# Patient Record
Sex: FEMALE | Race: WHITE | NOT HISPANIC OR LATINO | Employment: STUDENT | URBAN - NONMETROPOLITAN AREA
[De-identification: names, ages, dates, MRNs, and addresses within clinical notes are randomized per-mention and may not be internally consistent; named-entity substitution may affect disease eponyms.]

---

## 2019-05-30 ENCOUNTER — OFFICE VISIT (OUTPATIENT)
Dept: URGENT CARE | Facility: PHYSICIAN GROUP | Age: 34
End: 2019-05-30

## 2019-05-30 VITALS
TEMPERATURE: 96.9 F | HEART RATE: 54 BPM | WEIGHT: 191 LBS | SYSTOLIC BLOOD PRESSURE: 128 MMHG | DIASTOLIC BLOOD PRESSURE: 70 MMHG | OXYGEN SATURATION: 99 % | RESPIRATION RATE: 16 BRPM

## 2019-05-30 DIAGNOSIS — S01.01XA LACERATION OF SCALP, INITIAL ENCOUNTER: ICD-10-CM

## 2019-05-30 PROCEDURE — 99203 OFFICE O/P NEW LOW 30 MIN: CPT | Performed by: PHYSICIAN ASSISTANT

## 2019-05-30 RX ORDER — SULFAMETHOXAZOLE AND TRIMETHOPRIM 800; 160 MG/1; MG/1
1 TABLET ORAL 2 TIMES DAILY
Qty: 14 TAB | Refills: 0 | Status: SHIPPED | OUTPATIENT
Start: 2019-05-30 | End: 2019-06-06

## 2019-05-31 NOTE — PROGRESS NOTES
Chief Complaint   Patient presents with   • Head Injury     drill fell on head, no loss of consciousness, no dizziness, no nausea        HISTORY OF PRESENT ILLNESS: Patient is a 34 y.o. female who presents today for the following:    Patient comes in with her  for evaluation of a scalp laceration that she sustained a few hours ago.  Patient states the drill fell on her head.  It was a flat part, not the end with the bit.  Patient states it did bleed quite a bit but was able to wash it all out in the shower.  Her  then applied some superglue to the area.  She denies any pain but just wants to make sure it is okay.  Her last tetanus shot was a couple of years ago.    There are no active problems to display for this patient.      Allergies:Patient has no known allergies.    Current Outpatient Prescriptions Ordered in [facility]   Medication Sig Dispense Refill   • sulfamethoxazole-trimethoprim (BACTRIM DS) 800-160 MG tablet Take 1 Tab by mouth 2 times a day for 7 days. 14 Tab 0     No current Epic-ordered facility-administered medications on file.        No past medical history on file.    Social History   Substance Use Topics   • Smoking status: Never Smoker   • Smokeless tobacco: Never Used   • Alcohol use Yes      Comment: social, occ       No family status information on file.   No family history on file.    Review of Systems:   Constitutional ROS: No unexpected change in weight, No weakness, No fatigue  Musculoskeletal/Extremities ROS: No peripheral edema, No pain, redness or swelling on the joints  Hematologic/Lymphatic ROS: No chills, No night sweats, No weight loss  Skin/Integumentary ROS: Scalp laceration.      Exam:  /70   Pulse (!) 54   Temp 36.1 °C (96.9 °F)   Resp 16   Wt 86.6 kg (191 lb)   SpO2 99%   General: Well developed, well nourished. No distress.  Pulmonary: Unlabored respiratory effort.  Neurologic: Grossly nonfocal. No facial asymmetry noted.  Skin: Warm, dry, good turgor.  5 x  2 mm wound is noted on the left side of the scalp, just beyond the hairline above the forehead.  No active bleeding is noted.  Unable to approximate edges due to dried superglue in place.  Psych: Normal mood. Alert and oriented x3. Judgment and insight is normal.    Assessment/Plan:  Patient's tetanus shot is up-to-date.  No signs of infection noted at this time.  Contingent antibiotics provided should any signs of infection develop.  Discussed wound care at home.  Follow-up for any signs of infection as discussed in clinic.  1. Laceration of scalp, initial encounter  sulfamethoxazole-trimethoprim (BACTRIM DS) 800-160 MG tablet

## 2021-05-07 ENCOUNTER — OFFICE VISIT (OUTPATIENT)
Dept: MEDICAL GROUP | Facility: PHYSICIAN GROUP | Age: 36
End: 2021-05-07
Payer: COMMERCIAL

## 2021-05-07 VITALS
WEIGHT: 207 LBS | TEMPERATURE: 97.9 F | RESPIRATION RATE: 16 BRPM | HEIGHT: 64 IN | BODY MASS INDEX: 35.34 KG/M2 | OXYGEN SATURATION: 100 % | SYSTOLIC BLOOD PRESSURE: 118 MMHG | HEART RATE: 90 BPM | DIASTOLIC BLOOD PRESSURE: 80 MMHG

## 2021-05-07 DIAGNOSIS — E66.9 OBESITY (BMI 30-39.9): ICD-10-CM

## 2021-05-07 DIAGNOSIS — L98.9 SKIN LESION OF HAND: ICD-10-CM

## 2021-05-07 DIAGNOSIS — Z13.220 SCREENING, LIPID: ICD-10-CM

## 2021-05-07 DIAGNOSIS — Z13.0 SCREENING FOR ENDOCRINE, METABOLIC AND IMMUNITY DISORDER: ICD-10-CM

## 2021-05-07 DIAGNOSIS — Z13.228 SCREENING FOR ENDOCRINE, METABOLIC AND IMMUNITY DISORDER: ICD-10-CM

## 2021-05-07 DIAGNOSIS — Z00.00 ANNUAL PHYSICAL EXAM: ICD-10-CM

## 2021-05-07 DIAGNOSIS — Z30.431 ENCOUNTER FOR MANAGEMENT OF INTRAUTERINE CONTRACEPTIVE DEVICE (IUD), UNSPECIFIED IUD MANAGEMENT TYPE: ICD-10-CM

## 2021-05-07 DIAGNOSIS — Z13.29 SCREENING FOR ENDOCRINE, METABOLIC AND IMMUNITY DISORDER: ICD-10-CM

## 2021-05-07 PROCEDURE — 99214 OFFICE O/P EST MOD 30 MIN: CPT | Mod: 25 | Performed by: FAMILY MEDICINE

## 2021-05-07 PROCEDURE — 17110 DESTRUCTION B9 LES UP TO 14: CPT | Performed by: FAMILY MEDICINE

## 2021-05-07 ASSESSMENT — PATIENT HEALTH QUESTIONNAIRE - PHQ9: CLINICAL INTERPRETATION OF PHQ2 SCORE: 0

## 2021-05-07 NOTE — ASSESSMENT & PLAN NOTE
Round macular evenly pigmented slightly rough patch of skin on left hand dorsum.     Skin cleaned with alcohol pads  Treated with cryotherapy x 2 times with pulse application till ice ball persists.   Antibiotic ointment and bandaid placed  Patient tolerated the procedure well  Wound care instructions provided.

## 2021-05-07 NOTE — PROGRESS NOTES
"Subjective:   Ching Robertson is a 36 y.o. adult here today for evaluation and management of:     Encounter for management of intrauterine contraceptive device (IUD)  Patient has Paraguard IUD in place for 11 years no abdominal pain, no complications.   Has worked well for her.   She needs it removed and possible replacement.   She would like to have a referral to ob/gyn, referral placed today.   She asked about hysterectomy as her mother and mother's grandmother had heavy bleeding and cysts in her 50s and patient does not want to go though the same thing.   I advised that a hysterectomy may not be a an option, but it's something to discuss with the specialist.       Skin lesion of hand  Round macular evenly pigmented slightly rough patch of skin on left hand dorsum.     Skin cleaned with alcohol pads  Treated with cryotherapy x 2 times with pulse application till ice ball persists.   Antibiotic ointment and bandaid placed  Patient tolerated the procedure well  Wound care instructions provided.          Current medicines (including changes today)  No current outpatient medications on file.     No current facility-administered medications for this visit.     She  has no past medical history on file.    ROS  No chest pain, no shortness of breath, no abdominal pain       Objective:     /80   Pulse 90   Temp 36.6 °C (97.9 °F) (Temporal)   Resp 16   Ht 1.626 m (5' 4\")   Wt 93.9 kg (207 lb)   SpO2 100%  Body mass index is 35.53 kg/m².   Physical Exam:  Constitutional: Alert, no distress.  Skin: Warm, dry, good turgor, no rashes in visible areas.  Eye: Equal, round and reactive, conjunctiva clear, lids normal.  ENMT: Lips without lesions, good dentition, oropharynx clear.  Neck: Trachea midline, no masses, no thyromegaly. No cervical or supraclavicular lymphadenopathy  Respiratory: Unlabored respiratory effort, lungs clear to auscultation, no wheezes, no ronchi.  Cardiovascular: Normal S1, S2, no murmur, no " edema.  Abdomen: Soft, non-tender, no masses, no hepatosplenomegaly.  Psych: Alert and oriented x3, normal affect and mood.        Assessment and Plan:   The following treatment plan was discussed    1. Encounter for management of intrauterine contraceptive device (IUD), unspecified IUD management type    - REFERRAL TO OB/GYN    2. Obesity (BMI 30-39.9)    - Patient identified as having weight management issue.  Appropriate orders and counseling given.  - Comp Metabolic Panel; Future  - Lipid Profile; Future    3. Screening, lipid    - Lipid Profile; Future    4. Screening for endocrine, metabolic and immunity disorder    - Comp Metabolic Panel; Future    5. Annual physical exam  - Comp Metabolic Panel; Future  - Lipid Profile; Future      Followup: Return in about 6 months (around 11/7/2021) for PAP.

## 2021-05-07 NOTE — ASSESSMENT & PLAN NOTE
Patient has Paraguard IUD in place for 11 years no abdominal pain, no complications.   Has worked well for her.   She needs it removed and possible replacement.   She would like to have a referral to ob/gyn, referral placed today.   She asked about hysterectomy as her mother and mother's grandmother had heavy bleeding and cysts in her 50s and patient does not want to go though the same thing.   I advised that a hysterectomy may not be a an option, but it's something to discuss with the specialist.

## 2023-03-24 ENCOUNTER — OFFICE VISIT (OUTPATIENT)
Dept: MEDICAL GROUP | Facility: PHYSICIAN GROUP | Age: 38
End: 2023-03-24
Payer: COMMERCIAL

## 2023-03-24 ENCOUNTER — HOSPITAL ENCOUNTER (OUTPATIENT)
Facility: MEDICAL CENTER | Age: 38
End: 2023-03-24
Attending: FAMILY MEDICINE
Payer: COMMERCIAL

## 2023-03-24 VITALS
HEIGHT: 64 IN | DIASTOLIC BLOOD PRESSURE: 70 MMHG | WEIGHT: 199 LBS | OXYGEN SATURATION: 99 % | RESPIRATION RATE: 16 BRPM | HEART RATE: 63 BPM | TEMPERATURE: 97.6 F | BODY MASS INDEX: 33.97 KG/M2 | SYSTOLIC BLOOD PRESSURE: 126 MMHG

## 2023-03-24 DIAGNOSIS — E66.9 OBESITY (BMI 30-39.9): ICD-10-CM

## 2023-03-24 DIAGNOSIS — Z23 NEED FOR VACCINATION: ICD-10-CM

## 2023-03-24 DIAGNOSIS — Z30.432 ENCOUNTER FOR IUD REMOVAL: ICD-10-CM

## 2023-03-24 DIAGNOSIS — Z12.4 CERVICAL CANCER SCREENING: ICD-10-CM

## 2023-03-24 DIAGNOSIS — Z11.59 NEED FOR HEPATITIS C SCREENING TEST: ICD-10-CM

## 2023-03-24 DIAGNOSIS — E55.9 VITAMIN D DEFICIENCY: ICD-10-CM

## 2023-03-24 DIAGNOSIS — Z13.220 SCREENING, LIPID: ICD-10-CM

## 2023-03-24 PROCEDURE — 99000 SPECIMEN HANDLING OFFICE-LAB: CPT | Performed by: FAMILY MEDICINE

## 2023-03-24 PROCEDURE — 90686 IIV4 VACC NO PRSV 0.5 ML IM: CPT | Performed by: FAMILY MEDICINE

## 2023-03-24 PROCEDURE — 99396 PREV VISIT EST AGE 40-64: CPT | Mod: 25 | Performed by: FAMILY MEDICINE

## 2023-03-24 PROCEDURE — 87624 HPV HI-RISK TYP POOLED RSLT: CPT

## 2023-03-24 PROCEDURE — 90471 IMMUNIZATION ADMIN: CPT | Performed by: FAMILY MEDICINE

## 2023-03-24 PROCEDURE — 88175 CYTOPATH C/V AUTO FLUID REDO: CPT

## 2023-03-24 PROCEDURE — 58301 REMOVE INTRAUTERINE DEVICE: CPT | Performed by: FAMILY MEDICINE

## 2023-03-24 RX ORDER — CLOSTRIDIUM TETANI TOXOID ANTIGEN (FORMALDEHYDE INACTIVATED), CORYNEBACTERIUM DIPHTHERIAE TOXOID ANTIGEN (FORMALDEHYDE INACTIVATED), BORDETELLA PERTUSSIS TOXOID ANTIGEN (GLUTARALDEHYDE INACTIVATED), BORDETELLA PERTUSSIS FILAMENTOUS HEMAGGLUTININ ANTIGEN (FORMALDEHYDE INACTIVATED), BORDETELLA PERTUSSIS PERTACTIN ANTIGEN, AND BORDETELLA PERTUSSIS FIMBRIAE 2/3 ANTIGEN 5; 2; 2.5; 5; 3; 5 [LF]/.5ML; [LF]/.5ML; UG/.5ML; UG/.5ML; UG/.5ML; UG/.5ML
0.5 INJECTION, SUSPENSION INTRAMUSCULAR ONCE
Qty: 0.5 ML | Refills: 0 | Status: SHIPPED
Start: 2023-03-24 | End: 2023-03-24

## 2023-03-24 ASSESSMENT — PATIENT HEALTH QUESTIONNAIRE - PHQ9: CLINICAL INTERPRETATION OF PHQ2 SCORE: 0

## 2023-03-24 NOTE — PATIENT INSTRUCTIONS
Please get fasting labs, fasting for 8-10 hours before next visit. You can make an appointment for the lab or walk in.   Lab hours [facility] location: Monday to Friday 6 am - 4 pm, Saturday 7 am -noon  Even if you lose your lab paperwork, you can still come in to get your lab done.

## 2023-03-25 DIAGNOSIS — Z12.4 CERVICAL CANCER SCREENING: ICD-10-CM

## 2023-03-25 NOTE — ASSESSMENT & PLAN NOTE
Copper T iuD removed completely no complications.   IUD strings visualized with speculum.   Strings grasped with ringed forceps, with gentle traction, IUD removed, examined and entire IUD removal confirmed.   Patient tolerated the procedure well.

## 2023-03-28 PROBLEM — Z12.4 CERVICAL CANCER SCREENING: Status: ACTIVE | Noted: 2023-03-28

## 2023-03-28 LAB
CYTOLOGY REG CYTOL: NORMAL
HPV HR 12 DNA CVX QL NAA+PROBE: NEGATIVE
HPV16 DNA SPEC QL NAA+PROBE: NEGATIVE
HPV18 DNA SPEC QL NAA+PROBE: NEGATIVE
SPECIMEN SOURCE: NORMAL

## 2023-03-28 NOTE — PROGRESS NOTES
"Subjective:   Ching Robertson is a 38 y.o. female here today for evaluation and management of:     Encounter for IUD removal  Copper T iuD removed completely no complications.   IUD strings visualized with speculum.   Strings grasped with ringed forceps, with gentle traction, IUD removed, examined and entire IUD removal confirmed.   Patient tolerated the procedure well.       Cervical cancer screening  PAP completed.              Current medicines (including changes today)  No current outpatient medications on file.     No current facility-administered medications for this visit.     She  has no past medical history on file.    ROS  No chest pain, no shortness of breath, no abdominal pain       Objective:     /70 (BP Location: Left arm, Patient Position: Sitting, BP Cuff Size: Adult)   Pulse 63   Temp 36.4 °C (97.6 °F) (Temporal)   Resp 16   Ht 1.626 m (5' 4\")   Wt 90.3 kg (199 lb)   SpO2 99%  Body mass index is 34.16 kg/m².   Physical Exam:  Constitutional: Alert, no distress.  Skin: Warm, dry, good turgor, no rashes in visible areas.  Eye: Equal, round and reactive, conjunctiva clear, lids normal.  ENMT: Lips without lesions, good dentition, oropharynx clear.  Neck: Trachea midline, no masses, no thyromegaly. No cervical or supraclavicular lymphadenopathy  Respiratory: Unlabored respiratory effort, lungs clear to auscultation, no wheezes, no ronchi.  Cardiovascular: Normal S1, S2, no murmur, no edema.  Abdomen: Soft, non-tender, no masses, no hepatosplenomegaly.  Psych: Alert and oriented x3, normal affect and mood.  Pelvic: IUD strings seen, small amount of blood in vault from menses. No external masses, no vaginal masses, no cervical masses, no CMT.       Assessment and Plan:   The following treatment plan was discussed    1. Need for vaccination  - INFLUENZA VACCINE QUAD INJ (PF)  - tetanus-dipth-acell pertussis (ADACEL) 5-2-15.5 LF-MCG/0.5 Suspension; Inject 0.5 mL into the shoulder, thigh, or " buttocks one time for 1 dose.  Dispense: 0.5 mL; Refill: 0  - Hepatitis B Vaccine Adult 20+    2. Need for hepatitis C screening test  - HCV Scrn ( 9726-7801 1xLife); Future    3. Vitamin D deficiency  - VITAMIN D,25 HYDROXY (DEFICIENCY); Future    4. Screening, lipid  - Lipid Profile; Future    5. Obesity (BMI 30-39.9)  - Comp Metabolic Panel; Future    6. Cervical cancer screening  - THINPREP PAP WITH HPV; Future    7. Encounter for IUD removal      Followup: Return in about 3 months (around 2023) for Lab Review.

## 2023-04-11 NOTE — RESULT ENCOUNTER NOTE
Hi April Ailyn,   Your pap and hpv results are normal. Next is due in 3-5 years.   Chloe Brown M.D.

## 2023-06-08 ENCOUNTER — NON-PROVIDER VISIT (OUTPATIENT)
Dept: URGENT CARE | Facility: PHYSICIAN GROUP | Age: 38
End: 2023-06-08

## 2023-06-08 DIAGNOSIS — Z02.1 PRE-EMPLOYMENT DRUG SCREENING: ICD-10-CM

## 2023-06-08 LAB
AMP AMPHETAMINE: NORMAL
COC COCAINE: NORMAL
INT CON NEG: NORMAL
INT CON POS: NORMAL
MET METHAMPHETAMINES: NORMAL
OPI OPIATES: NORMAL
PCP PHENCYCLIDINE: NORMAL
POC DRUG COMMENT 753798-OCCUPATIONAL HEALTH: NORMAL
THC: NORMAL

## 2023-06-08 PROCEDURE — 80305 DRUG TEST PRSMV DIR OPT OBS: CPT | Performed by: FAMILY MEDICINE

## 2023-06-14 ENCOUNTER — APPOINTMENT (OUTPATIENT)
Dept: RADIOLOGY | Facility: MEDICAL CENTER | Age: 38
End: 2023-06-14
Attending: STUDENT IN AN ORGANIZED HEALTH CARE EDUCATION/TRAINING PROGRAM
Payer: COMMERCIAL

## 2023-06-14 ENCOUNTER — HOSPITAL ENCOUNTER (EMERGENCY)
Facility: MEDICAL CENTER | Age: 38
End: 2023-06-15
Attending: STUDENT IN AN ORGANIZED HEALTH CARE EDUCATION/TRAINING PROGRAM
Payer: COMMERCIAL

## 2023-06-14 DIAGNOSIS — R51.9 ACUTE NONINTRACTABLE HEADACHE, UNSPECIFIED HEADACHE TYPE: ICD-10-CM

## 2023-06-14 DIAGNOSIS — S16.1XXA STRAIN OF NECK MUSCLE, INITIAL ENCOUNTER: ICD-10-CM

## 2023-06-14 DIAGNOSIS — M54.2 NECK PAIN: ICD-10-CM

## 2023-06-14 PROCEDURE — 700102 HCHG RX REV CODE 250 W/ 637 OVERRIDE(OP): Performed by: STUDENT IN AN ORGANIZED HEALTH CARE EDUCATION/TRAINING PROGRAM

## 2023-06-14 PROCEDURE — 72125 CT NECK SPINE W/O DYE: CPT

## 2023-06-14 PROCEDURE — 700117 HCHG RX CONTRAST REV CODE 255: Performed by: STUDENT IN AN ORGANIZED HEALTH CARE EDUCATION/TRAINING PROGRAM

## 2023-06-14 PROCEDURE — 72040 X-RAY EXAM NECK SPINE 2-3 VW: CPT

## 2023-06-14 PROCEDURE — 70450 CT HEAD/BRAIN W/O DYE: CPT

## 2023-06-14 PROCEDURE — 99284 EMERGENCY DEPT VISIT MOD MDM: CPT

## 2023-06-14 PROCEDURE — 700101 HCHG RX REV CODE 250: Performed by: STUDENT IN AN ORGANIZED HEALTH CARE EDUCATION/TRAINING PROGRAM

## 2023-06-14 PROCEDURE — 96372 THER/PROPH/DIAG INJ SC/IM: CPT

## 2023-06-14 PROCEDURE — 96375 TX/PRO/DX INJ NEW DRUG ADDON: CPT

## 2023-06-14 PROCEDURE — A9270 NON-COVERED ITEM OR SERVICE: HCPCS | Performed by: STUDENT IN AN ORGANIZED HEALTH CARE EDUCATION/TRAINING PROGRAM

## 2023-06-14 PROCEDURE — 700105 HCHG RX REV CODE 258: Performed by: STUDENT IN AN ORGANIZED HEALTH CARE EDUCATION/TRAINING PROGRAM

## 2023-06-14 PROCEDURE — 700111 HCHG RX REV CODE 636 W/ 250 OVERRIDE (IP): Performed by: STUDENT IN AN ORGANIZED HEALTH CARE EDUCATION/TRAINING PROGRAM

## 2023-06-14 PROCEDURE — 70498 CT ANGIOGRAPHY NECK: CPT

## 2023-06-14 PROCEDURE — 96374 THER/PROPH/DIAG INJ IV PUSH: CPT

## 2023-06-14 RX ORDER — LIDOCAINE 50 MG/G
1 PATCH TOPICAL EVERY 24 HOURS
Status: DISCONTINUED | OUTPATIENT
Start: 2023-06-14 | End: 2023-06-15 | Stop reason: HOSPADM

## 2023-06-14 RX ORDER — SODIUM CHLORIDE 9 MG/ML
1000 INJECTION, SOLUTION INTRAVENOUS ONCE
Status: COMPLETED | OUTPATIENT
Start: 2023-06-14 | End: 2023-06-15

## 2023-06-14 RX ORDER — KETOROLAC TROMETHAMINE 30 MG/ML
15 INJECTION, SOLUTION INTRAMUSCULAR; INTRAVENOUS ONCE
Status: COMPLETED | OUTPATIENT
Start: 2023-06-14 | End: 2023-06-14

## 2023-06-14 RX ORDER — PROCHLORPERAZINE EDISYLATE 5 MG/ML
10 INJECTION INTRAMUSCULAR; INTRAVENOUS ONCE
Status: COMPLETED | OUTPATIENT
Start: 2023-06-14 | End: 2023-06-14

## 2023-06-14 RX ORDER — METHOCARBAMOL 500 MG/1
500 TABLET, FILM COATED ORAL ONCE
Status: COMPLETED | OUTPATIENT
Start: 2023-06-14 | End: 2023-06-14

## 2023-06-14 RX ORDER — HYDROMORPHONE HYDROCHLORIDE 1 MG/ML
0.5 INJECTION, SOLUTION INTRAMUSCULAR; INTRAVENOUS; SUBCUTANEOUS ONCE
Status: COMPLETED | OUTPATIENT
Start: 2023-06-14 | End: 2023-06-14

## 2023-06-14 RX ORDER — ACETAMINOPHEN 325 MG/1
650 TABLET ORAL ONCE
Status: COMPLETED | OUTPATIENT
Start: 2023-06-14 | End: 2023-06-14

## 2023-06-14 RX ADMIN — METHOCARBAMOL 500 MG: 500 TABLET ORAL at 21:54

## 2023-06-14 RX ADMIN — SODIUM CHLORIDE 1000 ML: 9 INJECTION, SOLUTION INTRAVENOUS at 22:36

## 2023-06-14 RX ADMIN — ACETAMINOPHEN 650 MG: 325 TABLET, FILM COATED ORAL at 21:54

## 2023-06-14 RX ADMIN — IOHEXOL 60 ML: 350 INJECTION, SOLUTION INTRAVENOUS at 23:40

## 2023-06-14 RX ADMIN — KETOROLAC TROMETHAMINE 15 MG: 30 INJECTION, SOLUTION INTRAMUSCULAR; INTRAVENOUS at 21:54

## 2023-06-14 RX ADMIN — HYDROMORPHONE HYDROCHLORIDE 0.5 MG: 1 INJECTION, SOLUTION INTRAMUSCULAR; INTRAVENOUS; SUBCUTANEOUS at 22:37

## 2023-06-14 RX ADMIN — PROCHLORPERAZINE EDISYLATE 10 MG: 5 INJECTION INTRAMUSCULAR; INTRAVENOUS at 22:38

## 2023-06-14 RX ADMIN — LIDOCAINE 1 PATCH: 50 PATCH CUTANEOUS at 21:45

## 2023-06-14 ASSESSMENT — PAIN DESCRIPTION - PAIN TYPE
TYPE: ACUTE PAIN
TYPE: ACUTE PAIN

## 2023-06-15 VITALS
HEART RATE: 53 BPM | WEIGHT: 202.16 LBS | TEMPERATURE: 97.5 F | BODY MASS INDEX: 34.51 KG/M2 | DIASTOLIC BLOOD PRESSURE: 57 MMHG | RESPIRATION RATE: 16 BRPM | SYSTOLIC BLOOD PRESSURE: 112 MMHG | HEIGHT: 64 IN | OXYGEN SATURATION: 98 %

## 2023-06-15 RX ORDER — LIDOCAINE 50 MG/G
1 PATCH TOPICAL EVERY 24 HOURS
Qty: 10 PATCH | Refills: 0 | Status: SHIPPED | OUTPATIENT
Start: 2023-06-15

## 2023-06-15 RX ORDER — METHOCARBAMOL 750 MG/1
750 TABLET, FILM COATED ORAL 4 TIMES DAILY
Qty: 120 TABLET | Refills: 0 | Status: SHIPPED | OUTPATIENT
Start: 2023-06-15

## 2023-06-15 NOTE — ED NOTES
"Discharge instructions reviewed with patient/family.  Patient verbalizes understanding of follow up care, medication management and reasons to return to the ER. PIV Removed with no complications. /57   Pulse (!) 53   Temp 36.4 °C (97.5 °F)   Resp 16   Ht 1.626 m (5' 4\")   Wt 91.7 kg (202 lb 2.6 oz)   LMP 06/07/2023   SpO2 98%   BMI 34.70 kg/m²   "

## 2023-06-15 NOTE — ED NOTES
Patient medicated as reflected in MAR. Patient verbalized understanding of all education provided. Patient tolerated medication at this time.

## 2023-06-15 NOTE — ED PROVIDER NOTES
"ED Provider Note    CHIEF COMPLAINT  Chief Complaint   Patient presents with    Neck Pain     L sided neck pain for appx 2-3 days with L sided headache. 8/10 dull pinching pain to neck. [place] a \"cracking and pop\" to neck today while sitting. +nausea        EXTERNAL RECORDS REVIEWED  Routine outpatient care    HPI/ROS  LIMITATION TO HISTORY   Select: : None  OUTSIDE HISTORIAN(S):  Significant other  at bedside    Ching Robertson is a 38 y.o. female who presents with neck pain.  Symptoms started about 3 days ago.  She said it started after her  cracked her back.  She says it is mostly to the left side of her neck.  She also has an associated headache which she describes as frontal.  He describes it as an 8 out of 10, dull and pinching sensation.  She said that today she had cracking and popping in her neck while she was sitting.  She denies any fevers, chills, recent illness.  No sore throat.  She did see her chiropractor today who ordered an x-ray but does not have the results yet.  She denies any numbness or tingling.  No weakness in her arms or legs.  No urinary retention, urinary incontinence, bowel incontinence or saddle anesthesia.    PAST MEDICAL HISTORY   has a past medical history of Patient denies medical problems.    SURGICAL HISTORY  patient denies any surgical history    FAMILY HISTORY  No family history on file.    SOCIAL HISTORY  Social History     Tobacco Use    Smoking status: Never    Smokeless tobacco: Never   Vaping Use    Vaping Use: Never used   Substance and Sexual Activity    Alcohol use: Yes     Comment: occ    Drug use: Never    Sexual activity: Yes       CURRENT MEDICATIONS  Home Medications       Reviewed by Mulu Sanchez R.N. (Registered Nurse) on 06/14/23 at 2030  Med List Status: Partial     Medication Last Dose Status        Patient Victorino Taking any Medications                           ALLERGIES  No Known Allergies    PHYSICAL EXAM  VITAL SIGNS: /57   Pulse (!) " "53   Temp 36.4 °C (97.5 °F)   Resp 16   Ht 1.626 m (5' 4\")   Wt 91.7 kg (202 lb 2.6 oz)   LMP 06/07/2023   SpO2 98%   BMI 34.70 kg/m²    Constitutional: Awake and alert. Nontoxic  HENT: No signs of trauma.  No erythema or edema to the posterior pharynx.  Eyes: Grossly normal  Neck: She has no midline tenderness. She has full range of motion without pain.  Cardiovascular: Normal heart rate   Thorax & Lungs: No respiratory distress  Abdomen: Nontender  Skin:  No pathologic rash.   Neuro:  CN II-CN XII intact  Sensation intact to light touch in all extremities.  Motor: Normal tone and bulk. No abnormal movements appreciated. Strength tested and 5/5 in bilateral wrist flexion/extension, elbow flexion/extension, shoulder abduction, straight leg raise, knee flexion/extension, ankle dorsiflexion/plantarflexion. Patient ambulates with a steady gait.  Extremities: Well perfused  Psychiatric: Affect normal      DIAGNOSTIC STUDIES / PROCEDURES        RADIOLOGY  I have independently interpreted the diagnostic imaging associated with this visit and am waiting the final reading from the radiologist.   My preliminary interpretation is as follows: No obvious ICH  Radiologist interpretation:   CT-CTA NECK WITH & W/O-POST PROCESSING   Final Result         1.  CT angiogram of the neck within normal limits.         CT-CSPINE WITHOUT PLUS RECONS   Final Result         1.  No acute traumatic bony injury of the cervical spine is apparent.      CT-HEAD W/O   Final Result         1.  No acute intracranial abnormality.         DX-CERVICAL SPINE-2 OR 3 VIEWS   Final Result         1.  No acute traumatic bony injury of the cervical spine is appreciated.            COURSE & MEDICAL DECISION MAKING    ED Observation Status? Yes; I am placing the patient in to an observation status due to a diagnostic uncertainty as well as therapeutic intensity. Patient placed in observation status at 21.07 PM, 6/14/2023.     Observation plan is as " follows: IV, CT, Serial Exams    Upon Reevaluation, the patient's condition has: Improved; and will be discharged.    Patient discharged from ED Observation status at 12:14 AM 6/15/2023      INITIAL ASSESSMENT, COURSE AND PLAN  Care Narrative: Patient is afebrile, vital signs reassuring.  She is mentating normally and neurologically intact.  Exam remarkable for tenderness to the cervical paraspinal muscle. No midline spinal tenderness. No overlying rashes. No swelling to the posterior pharynx, tonsils appear normal bilaterally.  Patient is able to range neck without limitation.  Low suspicion for infectious source, such as RPA or PTA . They are non toxic, mentating normally without meningismus and doubt meningitis or CNS infection.  Regarding her headache she does not have a cranial nerve palsy to suggest a venous sinus thrombosis.  No visual changes to suggest intracranial hypertension.  Low suspicion for glaucoma or temporal arteritis given young age.  She was treated with Robaxin, Toradol, Tylenol.     10:24 PM  She was reevaluated she does not report any improvement.  X-ray is negative for acute fracture.  Given no improvement, will obtain further imaging to evaluate for intracranial bleed and/or dissection.     12:13 AM  CTA negative for dissection or occlusion.  CT without evidence of fracture or subluxation.  No evidence of intracranial bleed, history not consistent with SAH.  She was treated with Dilaudid, Compazine and a fluid bolus at this point.  Patient reevaluated she does report improvement.  She continues to be neurologically intact with normal vital signs.  I feel reassured by her normal work-up in the ER and think she can be safely discharged.  Most likely she has a muscle strain, headache more likely benign etiology.  Advised close outpatient follow-up and strict ER return precautions and she expresses understanding.  She was discharged home in good condition.    Of note ,patient stated no chance  of pregnancy prior to CT scan and was okay to proceed with CT without waiting for confirmation with pregnancy test.    HYDRATION: Based on the patient's presentation of Inability to take oral fluids the patient was given IV fluids. IV Hydration was used because oral hydration was not adequate alone. Upon recheck following hydration, the patient was improved.        DISPOSITION AND DISCUSSIONS  I have discussed management of the patient with the following physicians and ERINN's:  None    Discussion of management with other QHP or appropriate source(s): None     Escalation of care considered, and ultimately not performed:blood analysis    Barriers to care at this time, including but not limited to:  None .     Decision tools and prescription drugs considered including, but not limited to: Pain medications ibuprofen, Tylenol, Robaxin and lidocaine patches.    FINAL DIAGNOSIS  1. Neck pain Acute   2. Strain of neck muscle, initial encounter Acute   3. Acute nonintractable headache, unspecified headache type           Electronically signed by: Katrin Timmons M.D., 6/14/2023 9:19 PM

## 2023-06-15 NOTE — ED TRIAGE NOTES
"Chief Complaint   Patient presents with    Neck Pain     L sided neck pain for appx 2-3 days with L sided headache. 8/10 dull pinching pain to neck. [place] a \"cracking and pop\" to neck today while sitting. +nausea        Pt ambulated with steady gait to triage. Pt A&Ox4, on room air. Pt states neck pain started after  \"cracked\" her back. Pt saw chiropracter today and had x-ray to neck done, still does not have results. Pt denies any numbness or tingling, still has control of bowel and bladder. +dizziness since pain.     Pt to lobby . Pt educated on alerting staff in changes to condition. Pt verbalized understanding.     /69   Pulse 66   Temp 35.8 °C (96.5 °F) (Temporal)   Resp 16   Ht 1.626 m (5' 4\")   Wt 91.7 kg (202 lb 2.6 oz)   LMP 06/07/2023   SpO2 100%   BMI 34.70 kg/m²     "

## 2023-06-15 NOTE — DISCHARGE INSTRUCTIONS
For pain please alternate between ibuprofen and Tylenol every 4 hours.  You can also take the muscle relaxant as prescribed but please do not drink, drive or operate machinery when using this medication.  Please follow-up closely with your primary care doctor if you have persistent symptoms they can order further imaging if needed.  If you have any increase or worsening pain, numbness, weakness, or bladder or bowel dysfunction  please return to the ER immediately.

## 2024-10-18 ENCOUNTER — APPOINTMENT (OUTPATIENT)
Dept: RADIOLOGY | Facility: MEDICAL CENTER | Age: 39
End: 2024-10-18
Attending: FAMILY MEDICINE
Payer: COMMERCIAL

## 2024-10-18 ENCOUNTER — OFFICE VISIT (OUTPATIENT)
Dept: URGENT CARE | Facility: PHYSICIAN GROUP | Age: 39
End: 2024-10-18
Payer: COMMERCIAL

## 2024-10-18 ENCOUNTER — HOSPITAL ENCOUNTER (OUTPATIENT)
Dept: LAB | Facility: MEDICAL CENTER | Age: 39
End: 2024-10-18
Attending: FAMILY MEDICINE
Payer: COMMERCIAL

## 2024-10-18 VITALS
OXYGEN SATURATION: 98 % | RESPIRATION RATE: 20 BRPM | BODY MASS INDEX: 35.2 KG/M2 | TEMPERATURE: 97.2 F | WEIGHT: 206.2 LBS | DIASTOLIC BLOOD PRESSURE: 62 MMHG | HEART RATE: 78 BPM | SYSTOLIC BLOOD PRESSURE: 120 MMHG | HEIGHT: 64 IN

## 2024-10-18 DIAGNOSIS — R19.7 DIARRHEA, UNSPECIFIED TYPE: ICD-10-CM

## 2024-10-18 LAB
BASOPHILS # BLD AUTO: 0.3 % (ref 0–1.8)
BASOPHILS # BLD: 0.03 K/UL (ref 0–0.12)
C DIFF DNA SPEC QL NAA+PROBE: NEGATIVE
C DIFF TOX GENS STL QL NAA+PROBE: NEGATIVE
C PARVUM AG STL QL IA.RAPID: NEGATIVE
EOSINOPHIL # BLD AUTO: 0 K/UL (ref 0–0.51)
EOSINOPHIL NFR BLD: 0 % (ref 0–6.9)
ERYTHROCYTE [DISTWIDTH] IN BLOOD BY AUTOMATED COUNT: 45.7 FL (ref 35.9–50)
G LAMBLIA AG STL QL IA.RAPID: NEGATIVE
HCT VFR BLD AUTO: 46.3 % (ref 37–47)
HGB BLD-MCNC: 15.4 G/DL (ref 12–16)
IMM GRANULOCYTES # BLD AUTO: 0.02 K/UL (ref 0–0.11)
IMM GRANULOCYTES NFR BLD AUTO: 0.2 % (ref 0–0.9)
LYMPHOCYTES # BLD AUTO: 0.59 K/UL (ref 1–4.8)
LYMPHOCYTES NFR BLD: 5.7 % (ref 22–41)
MCH RBC QN AUTO: 30.9 PG (ref 27–33)
MCHC RBC AUTO-ENTMCNC: 33.3 G/DL (ref 32.2–35.5)
MCV RBC AUTO: 92.8 FL (ref 81.4–97.8)
MONOCYTES # BLD AUTO: 0.28 K/UL (ref 0–0.85)
MONOCYTES NFR BLD AUTO: 2.7 % (ref 0–13.4)
NEUTROPHILS # BLD AUTO: 9.49 K/UL (ref 1.82–7.42)
NEUTROPHILS NFR BLD: 91.1 % (ref 44–72)
NRBC # BLD AUTO: 0 K/UL
NRBC BLD-RTO: 0 /100 WBC (ref 0–0.2)
PLATELET # BLD AUTO: 313 K/UL (ref 164–446)
PMV BLD AUTO: 10.7 FL (ref 9–12.9)
RBC # BLD AUTO: 4.99 M/UL (ref 4.2–5.4)
WBC # BLD AUTO: 10.4 K/UL (ref 4.8–10.8)

## 2024-10-18 PROCEDURE — 36415 COLL VENOUS BLD VENIPUNCTURE: CPT

## 2024-10-18 PROCEDURE — 87493 C DIFF AMPLIFIED PROBE: CPT

## 2024-10-18 PROCEDURE — 83690 ASSAY OF LIPASE: CPT

## 2024-10-18 PROCEDURE — 3078F DIAST BP <80 MM HG: CPT | Performed by: FAMILY MEDICINE

## 2024-10-18 PROCEDURE — 85025 COMPLETE CBC W/AUTO DIFF WBC: CPT

## 2024-10-18 PROCEDURE — 87328 CRYPTOSPORIDIUM AG IA: CPT

## 2024-10-18 PROCEDURE — 3074F SYST BP LT 130 MM HG: CPT | Performed by: FAMILY MEDICINE

## 2024-10-18 PROCEDURE — 80053 COMPREHEN METABOLIC PANEL: CPT

## 2024-10-18 PROCEDURE — 99213 OFFICE O/P EST LOW 20 MIN: CPT | Performed by: FAMILY MEDICINE

## 2024-10-18 PROCEDURE — 87045 FECES CULTURE AEROBIC BACT: CPT

## 2024-10-18 PROCEDURE — 87899 AGENT NOS ASSAY W/OPTIC: CPT

## 2024-10-18 PROCEDURE — 87329 GIARDIA AG IA: CPT

## 2024-10-18 PROCEDURE — 87046 STOOL CULTR AEROBIC BACT EA: CPT

## 2024-10-18 RX ORDER — ONDANSETRON 4 MG/1
4 TABLET, ORALLY DISINTEGRATING ORAL EVERY 8 HOURS PRN
Qty: 10 TABLET | Refills: 0 | Status: SHIPPED | OUTPATIENT
Start: 2024-10-18

## 2024-10-18 RX ORDER — DICYCLOMINE HCL 20 MG
20 TABLET ORAL EVERY 6 HOURS
Qty: 30 TABLET | Refills: 0 | Status: SHIPPED | OUTPATIENT
Start: 2024-10-18

## 2024-10-19 LAB
ALBUMIN SERPL BCP-MCNC: 4.2 G/DL (ref 3.2–4.9)
ALBUMIN/GLOB SERPL: 1.5 G/DL
ALP SERPL-CCNC: 80 U/L (ref 30–99)
ALT SERPL-CCNC: 20 U/L (ref 2–50)
ANION GAP SERPL CALC-SCNC: 14 MMOL/L (ref 7–16)
AST SERPL-CCNC: 16 U/L (ref 12–45)
BILIRUB SERPL-MCNC: 0.8 MG/DL (ref 0.1–1.5)
BUN SERPL-MCNC: 10 MG/DL (ref 8–22)
CALCIUM ALBUM COR SERPL-MCNC: 8.2 MG/DL (ref 8.5–10.5)
CALCIUM SERPL-MCNC: 8.4 MG/DL (ref 8.5–10.5)
CHLORIDE SERPL-SCNC: 104 MMOL/L (ref 96–112)
CO2 SERPL-SCNC: 22 MMOL/L (ref 20–33)
CREAT SERPL-MCNC: 0.7 MG/DL (ref 0.5–1.4)
E COLI SXT1+2 STL IA: NORMAL
GFR SERPLBLD CREATININE-BSD FMLA CKD-EPI: 111 ML/MIN/1.73 M 2
GLOBULIN SER CALC-MCNC: 2.8 G/DL (ref 1.9–3.5)
GLUCOSE SERPL-MCNC: 98 MG/DL (ref 65–99)
LIPASE SERPL-CCNC: 22 U/L (ref 11–82)
POTASSIUM SERPL-SCNC: 4.4 MMOL/L (ref 3.6–5.5)
PROT SERPL-MCNC: 7 G/DL (ref 6–8.2)
SIGNIFICANT IND 70042: NORMAL
SITE SITE: NORMAL
SODIUM SERPL-SCNC: 140 MMOL/L (ref 135–145)
SOURCE SOURCE: NORMAL

## 2024-10-21 LAB
BACTERIA STL CULT: NORMAL
E COLI SXT1+2 STL IA: NORMAL
SIGNIFICANT IND 70042: NORMAL
SITE SITE: NORMAL
SOURCE SOURCE: NORMAL

## 2025-02-13 ENCOUNTER — APPOINTMENT (OUTPATIENT)
Dept: RADIOLOGY | Facility: MEDICAL CENTER | Age: 40
End: 2025-02-13
Attending: FAMILY MEDICINE
Payer: COMMERCIAL

## 2025-02-13 DIAGNOSIS — Z12.31 VISIT FOR SCREENING MAMMOGRAM: ICD-10-CM

## 2025-03-14 ENCOUNTER — APPOINTMENT (OUTPATIENT)
Dept: RADIOLOGY | Facility: MEDICAL CENTER | Age: 40
End: 2025-03-14
Attending: FAMILY MEDICINE
Payer: COMMERCIAL

## 2025-03-14 DIAGNOSIS — Z12.31 VISIT FOR SCREENING MAMMOGRAM: ICD-10-CM

## 2025-03-14 PROCEDURE — 77067 SCR MAMMO BI INCL CAD: CPT

## 2025-03-21 ENCOUNTER — RESULTS FOLLOW-UP (OUTPATIENT)
Dept: MEDICAL GROUP | Facility: PHYSICIAN GROUP | Age: 40
End: 2025-03-21

## 2025-04-10 ENCOUNTER — HOSPITAL ENCOUNTER (OUTPATIENT)
Dept: RADIOLOGY | Facility: MEDICAL CENTER | Age: 40
End: 2025-04-10
Attending: FAMILY MEDICINE
Payer: COMMERCIAL

## 2025-04-10 DIAGNOSIS — R92.8 ABNORMAL MAMMOGRAM: ICD-10-CM

## 2025-04-10 PROCEDURE — G0279 TOMOSYNTHESIS, MAMMO: HCPCS

## 2025-04-17 ENCOUNTER — RESULTS FOLLOW-UP (OUTPATIENT)
Dept: MEDICAL GROUP | Facility: PHYSICIAN GROUP | Age: 40
End: 2025-04-17